# Patient Record
Sex: FEMALE | Race: BLACK OR AFRICAN AMERICAN | NOT HISPANIC OR LATINO | Employment: FULL TIME | URBAN - METROPOLITAN AREA
[De-identification: names, ages, dates, MRNs, and addresses within clinical notes are randomized per-mention and may not be internally consistent; named-entity substitution may affect disease eponyms.]

---

## 2020-09-30 ENCOUNTER — OFFICE VISIT (OUTPATIENT)
Dept: OBSTETRICS AND GYNECOLOGY | Facility: CLINIC | Age: 25
End: 2020-09-30
Payer: MEDICAID

## 2020-09-30 VITALS
HEART RATE: 108 BPM | SYSTOLIC BLOOD PRESSURE: 148 MMHG | BODY MASS INDEX: 30.16 KG/M2 | HEIGHT: 65 IN | WEIGHT: 181 LBS | DIASTOLIC BLOOD PRESSURE: 91 MMHG

## 2020-09-30 DIAGNOSIS — N30.01 ACUTE CYSTITIS WITH HEMATURIA: Primary | ICD-10-CM

## 2020-09-30 DIAGNOSIS — Z11.3 SCREEN FOR STD (SEXUALLY TRANSMITTED DISEASE): ICD-10-CM

## 2020-09-30 DIAGNOSIS — E66.09 CLASS 1 OBESITY DUE TO EXCESS CALORIES WITHOUT SERIOUS COMORBIDITY WITH BODY MASS INDEX (BMI) OF 30.0 TO 30.9 IN ADULT: ICD-10-CM

## 2020-09-30 PROBLEM — E66.9 OBESE: Status: ACTIVE | Noted: 2020-09-30

## 2020-09-30 PROCEDURE — 99203 PR OFFICE/OUTPT VISIT, NEW, LEVL III, 30-44 MIN: ICD-10-PCS | Mod: S$GLB,,, | Performed by: STUDENT IN AN ORGANIZED HEALTH CARE EDUCATION/TRAINING PROGRAM

## 2020-09-30 PROCEDURE — 99203 OFFICE O/P NEW LOW 30 MIN: CPT | Mod: S$GLB,,, | Performed by: STUDENT IN AN ORGANIZED HEALTH CARE EDUCATION/TRAINING PROGRAM

## 2020-09-30 RX ORDER — GRANULES FOR ORAL 3 G/1
3 POWDER ORAL ONCE
Qty: 3 G | Refills: 0 | Status: SHIPPED | OUTPATIENT
Start: 2020-09-30 | End: 2020-09-30

## 2020-09-30 NOTE — PROGRESS NOTES
"Chief Complaint: UTI    HPI: Patient is a 25 y.o. y.o. female G0 who presents to GYN clinic for evaluation of UTI.  Patient reports having UTI 3 times this year, however she has never been treated with antibiotics.  She reports she does increase her fluid intake when she is told she has UTI.  She reports good hygiene habits denies any fevers chills or back pain.  She would also like to be screened for STDs today denies any new partners in the past several years.  She has no other complaints at this time.    Review of Systems   Constitutional: Negative for chills and fever.   HENT: Negative for congestion and sore throat.    Respiratory: Negative for cough and shortness of breath.    Cardiovascular: Negative for chest pain and palpitations.   Gastrointestinal: Negative for abdominal pain, nausea and vomiting.   Genitourinary: Positive for dysuria and frequency.   Musculoskeletal: Negative for back pain.   Neurological: Negative for dizziness and headaches.   Psychiatric/Behavioral: Negative for depression and substance abuse.     PMH: denies  PSH: denies  Meds: denies  NKDA  Obhx: G0  GYNhx: denies abnormal pap smears, denies STD's, menstrual cycles - monthly, 6 days, mild VB  Social hx: denies t/d/e  Family hx: denies breast, colon, ovarian or uterine cancers.     Physical Exam:  Vitals:    09/30/20 1431   BP: (!) 148/91   Pulse: 108   Weight: 82.1 kg (181 lb)   Height: 5' 5" (1.651 m)   Body mass index is 30.12 kg/m².    Gen: NAD, well developed, well nourished, obese  Psych: alert and oriented x 3, normal affect  HEENT: normocephalic, atraumatic  Abd: soft, non-tender, non-distended  Pelvic: NEFG, no masses or lesions, vagina pink with rugae, no VB or VD, non friable cervix  BME: no CMT, uterus small, no tenderness or masses appreciated  Skin: warm and dry  Ext: no c/c/c, moves all extremities  Neurological: normal gait, gross motor function intact    Results:  UA - + nitrates and blood    Assessment: Patient is a " 25 y.o. y.o. female G0 with  Patient Active Problem List   Diagnosis    Acute cystitis with hematuria     Plan:  UA - + UTI today, will treat with fosfomycin 3 gm once   Urine culture sent  Pt to increase fluid intake, counseled on proper hygiene   GC/CZ, trich screening today  RTC in 1 year or sooner if needed     Ian Rodriguez MD

## 2020-12-09 ENCOUNTER — OFFICE VISIT (OUTPATIENT)
Dept: OBSTETRICS AND GYNECOLOGY | Facility: CLINIC | Age: 25
End: 2020-12-09
Payer: MEDICAID

## 2020-12-09 VITALS
DIASTOLIC BLOOD PRESSURE: 83 MMHG | HEART RATE: 87 BPM | SYSTOLIC BLOOD PRESSURE: 127 MMHG | BODY MASS INDEX: 30.12 KG/M2 | WEIGHT: 181 LBS

## 2020-12-09 DIAGNOSIS — Z31.89 ENCOUNTER FOR FERTILITY PLANNING: ICD-10-CM

## 2020-12-09 DIAGNOSIS — N93.9 ABNORMAL UTERINE BLEEDING (AUB): Primary | ICD-10-CM

## 2020-12-09 PROCEDURE — 99213 OFFICE O/P EST LOW 20 MIN: CPT | Mod: S$GLB,,, | Performed by: STUDENT IN AN ORGANIZED HEALTH CARE EDUCATION/TRAINING PROGRAM

## 2020-12-09 PROCEDURE — 99213 PR OFFICE/OUTPT VISIT, EST, LEVL III, 20-29 MIN: ICD-10-PCS | Mod: S$GLB,,, | Performed by: STUDENT IN AN ORGANIZED HEALTH CARE EDUCATION/TRAINING PROGRAM

## 2020-12-09 NOTE — PROGRESS NOTES
Chief Complaint: AUB, fertility    HPI: Patient is a 25 y.o. y.o. female G0 who presents to GYN clinic for AUB and fertility.  Patient reports that her UTI from her last visit has cleared up her medications.  She reports irregular cycles, reports missing occasional cycles.  Reports her last menstrual cycle was December 1st, she is currently off her cycle now.  She is in should in pregnancy reports trying earlier this year for a few months.  Not currently trying to become pregnant this time.  She is wanting to make sure she is still fertile and has the ability to become pregnant.  She has no other complaints this time.  Denies fever chills, nausea vomiting, chest pain, shortness of breath, vaginal bleeding, vaginal discharge, dysuria, lower extremity pain.    Physical Exam:  Vitals:    12/09/20 1548   BP: 127/83   Pulse: 87   Weight: 82.1 kg (181 lb)   Body mass index is 30.12 kg/m².    Gen: NAD, well developed, well nourished, obese  Psych: alert and oriented x 3, normal affect  HEENT: normocephalic, atraumatic  Non labored breathing  Skin: warm and dry  Ext: no c/c/c, moves all extremities  Neurological: normal gait, gross motor function intact    Results:  GC/CZ/trich - negative   Urine culture (9/30) - negative     Assessment: Patient is a 25 y.o. y.o. female G0 with  Patient Active Problem List   Diagnosis    Acute cystitis with hematuria    Obese     Plan:  Patient counseled on infertility, patient counseled that she would after the attempt pregnancy without contraception for 1 year without success to be considered for fertility workup  Patient given reassurance she was to be fertile at this age  Patient counseled on weight loss, and benefits of weight loss with improving menstrual cycles   Patient instructed to use ovulation kits and timed intercourse when she is ready for pregnancy  Patient does not desire contraception at this time  Return to clinic in 1 year for annual or as needed for problems    Ian  MD Michael

## 2022-04-11 ENCOUNTER — HISTORICAL (OUTPATIENT)
Dept: ADMINISTRATIVE | Facility: HOSPITAL | Age: 27
End: 2022-04-11
Payer: MEDICAID

## 2022-04-27 VITALS
HEIGHT: 65 IN | BODY MASS INDEX: 32.69 KG/M2 | SYSTOLIC BLOOD PRESSURE: 128 MMHG | WEIGHT: 196.19 LBS | DIASTOLIC BLOOD PRESSURE: 85 MMHG

## 2022-11-14 ENCOUNTER — LAB VISIT (OUTPATIENT)
Dept: LAB | Facility: HOSPITAL | Age: 27
End: 2022-11-14
Attending: OBSTETRICS & GYNECOLOGY
Payer: MEDICAID

## 2022-11-14 DIAGNOSIS — Z34.80 PRENATAL CARE, SUBSEQUENT PREGNANCY: Primary | ICD-10-CM

## 2022-11-14 LAB — B-HCG FREE SERPL-ACNC: 8821.15 MIU/ML

## 2022-11-14 PROCEDURE — 36415 COLL VENOUS BLD VENIPUNCTURE: CPT

## 2022-11-14 PROCEDURE — 84702 CHORIONIC GONADOTROPIN TEST: CPT

## 2023-11-20 ENCOUNTER — LAB VISIT (OUTPATIENT)
Dept: LAB | Facility: HOSPITAL | Age: 28
End: 2023-11-20
Attending: OBSTETRICS & GYNECOLOGY
Payer: MEDICAID

## 2023-11-20 DIAGNOSIS — Z34.80 PRENATAL CARE, SUBSEQUENT PREGNANCY: Primary | ICD-10-CM

## 2023-11-20 LAB
B-HCG FREE SERPL-ACNC: 65.97 MIU/ML
PROGEST SERPL-MCNC: 20.1 NG/ML

## 2023-11-20 PROCEDURE — 84144 ASSAY OF PROGESTERONE: CPT

## 2023-11-20 PROCEDURE — 36415 COLL VENOUS BLD VENIPUNCTURE: CPT

## 2023-11-20 PROCEDURE — 84702 CHORIONIC GONADOTROPIN TEST: CPT

## 2023-11-22 ENCOUNTER — LAB VISIT (OUTPATIENT)
Dept: LAB | Facility: HOSPITAL | Age: 28
End: 2023-11-22
Attending: OBSTETRICS & GYNECOLOGY
Payer: MEDICAID

## 2023-11-22 DIAGNOSIS — Z34.80 PRENATAL CARE, SUBSEQUENT PREGNANCY: Primary | ICD-10-CM

## 2023-11-22 LAB — B-HCG FREE SERPL-ACNC: 211.97 MIU/ML

## 2023-11-22 PROCEDURE — 36415 COLL VENOUS BLD VENIPUNCTURE: CPT

## 2023-11-22 PROCEDURE — 84702 CHORIONIC GONADOTROPIN TEST: CPT

## 2023-11-27 ENCOUNTER — LAB VISIT (OUTPATIENT)
Dept: LAB | Facility: HOSPITAL | Age: 28
End: 2023-11-27
Attending: OBSTETRICS & GYNECOLOGY
Payer: MEDICAID

## 2023-11-27 DIAGNOSIS — Z34.80 PRENATAL CARE, SUBSEQUENT PREGNANCY: Primary | ICD-10-CM

## 2023-11-27 LAB — B-HCG FREE SERPL-ACNC: 2001.72 MIU/ML

## 2023-11-27 PROCEDURE — 84702 CHORIONIC GONADOTROPIN TEST: CPT

## 2023-11-27 PROCEDURE — 36415 COLL VENOUS BLD VENIPUNCTURE: CPT

## 2023-11-29 ENCOUNTER — LAB VISIT (OUTPATIENT)
Dept: LAB | Facility: HOSPITAL | Age: 28
End: 2023-11-29
Attending: OBSTETRICS & GYNECOLOGY
Payer: MEDICAID

## 2023-11-29 DIAGNOSIS — Z34.80 PRENATAL CARE, SUBSEQUENT PREGNANCY: Primary | ICD-10-CM

## 2023-11-29 LAB — B-HCG FREE SERPL-ACNC: 4035.29 MIU/ML

## 2023-11-29 PROCEDURE — 36415 COLL VENOUS BLD VENIPUNCTURE: CPT

## 2023-11-29 PROCEDURE — 84702 CHORIONIC GONADOTROPIN TEST: CPT

## 2023-12-04 ENCOUNTER — LAB VISIT (OUTPATIENT)
Dept: LAB | Facility: HOSPITAL | Age: 28
End: 2023-12-04
Attending: OBSTETRICS & GYNECOLOGY
Payer: MEDICAID

## 2023-12-04 DIAGNOSIS — Z34.80 PRENATAL CARE, SUBSEQUENT PREGNANCY: Primary | ICD-10-CM

## 2023-12-04 LAB — B-HCG FREE SERPL-ACNC: ABNORMAL MIU/ML

## 2023-12-04 PROCEDURE — 36415 COLL VENOUS BLD VENIPUNCTURE: CPT

## 2023-12-04 PROCEDURE — 84702 CHORIONIC GONADOTROPIN TEST: CPT

## 2024-03-14 DIAGNOSIS — J35.1 TONSILLAR HYPERTROPHY: Primary | ICD-10-CM

## 2024-06-12 ENCOUNTER — OFFICE VISIT (OUTPATIENT)
Dept: OTOLARYNGOLOGY | Facility: CLINIC | Age: 29
End: 2024-06-12
Payer: MEDICAID

## 2024-06-12 DIAGNOSIS — J35.1 TONSILLAR HYPERTROPHY: Primary | ICD-10-CM

## 2024-06-12 DIAGNOSIS — J03.91 RECURRENT TONSILLITIS: ICD-10-CM

## 2024-06-12 DIAGNOSIS — G47.30 SLEEP-DISORDERED BREATHING: ICD-10-CM

## 2024-06-12 DIAGNOSIS — J35.8 ASYMMETRIC TONSILS: ICD-10-CM

## 2024-06-12 PROCEDURE — 99213 OFFICE O/P EST LOW 20 MIN: CPT | Mod: PBBFAC | Performed by: STUDENT IN AN ORGANIZED HEALTH CARE EDUCATION/TRAINING PROGRAM

## 2024-06-12 RX ORDER — SODIUM CHLORIDE 9 MG/ML
INJECTION, SOLUTION INTRAVENOUS CONTINUOUS
OUTPATIENT
Start: 2024-06-12

## 2024-06-12 RX ORDER — MUPIROCIN 20 MG/G
OINTMENT TOPICAL
OUTPATIENT
Start: 2024-06-12

## 2024-06-12 NOTE — PROGRESS NOTES
[facility]  Otolaryngology-Head & Neck Surgery    Office Visit    Marta Markham  [id number]  1995    CC: recurrent tonsillitis    HPI: Marta Markham is a 28 y.o. female with no significant past medical history presents to the ENT clinic for evaluation of recurrent tonsillitis.  Patient states multiple tonsil infections per year over the last 5 years.  She says that on average she gets infections per year over the last 5 years.  During these episodes she presents to the urgent care and is treated with antibiotics and steroids.  After a couple of weeks her symptoms will improve however her tonsils remain chronically enlarged affecting her sleeping.  She snores loudly during her sleep to the point that her boyfriend will no longer sleep in the same room with her.  No witnessed apneas however she will periodically wake up gasping for air at night.  She has no history of personal easy bleeding or bruising and is not on any blood thinners.  She has no family history of bleeding disorders.    Review of patient's allergies indicates:   Allergen Reactions    Ciprofloxacin hcl Rash       Past Medical History:   Diagnosis Date    Acne        History reviewed. No pertinent surgical history.    Social History     Socioeconomic History    Marital status: Single   Tobacco Use    Smoking status: Never    Smokeless tobacco: Never   Substance and Sexual Activity    Alcohol use: Never    Drug use: Never    Sexual activity: Yes     Partners: Male     Birth control/protection: Condom       No family history on file.    No outpatient encounter medications on file as of 6/12/2024.     No facility-administered encounter medications on file as of 6/12/2024.       PHYSICAL EXAM:  There were no vitals filed for this visit.    General Appearance: well nourished, well-developed, alert, oriented, in no acute distress, no dysphonia  Head/Face: Normocephalic, atraumatic  Eyes: EOMI, normal  conjunctiva  Ears: Hears well at normal conversation volume  AD: external normal, ear canal normal, TM intact without effusion or retraction  AS: external normal, ear canal normal, TM intact without effusion or retraction  Nose: External nose normal, septum midline, mild inferior turbinate hypertrophy, no epistaxis  Oral Cavity & Oropharynx: Lips normal. Tongue without masses or lesions. Dentition there. Oropharynx unremarkable. No masses, lesions, or leukoplakia. Floor of mouth and base of tongue are soft.  Tonsils are 4+ bilaterally, R>L  Neck: Soft, non-tender, no palpable lymph nodes. Thyroid without nodules or goiter.   Neuro: CN II - XII intact  Psychiatric: oriented to time, place and person, no depression, anxiety or agitation      ASSESSMENT:  Marta Markham is a 28 y.o. female with recurrent tonsillitis bilateral asymmetric tonsillar hypertrophy and significant sleep symptoms.    PLAN:  -- She does meet [place] criteria for tonsillectomy.  She will also benefit tonsillectomy because of her significant sleep symptoms.  Also given her tonsils are asymmetric tonsillectomy would be valuable for diagnostic purposes.  -- Discussed risks and benefits of surgery in great detail with patient.  Patient agrees to proceed with surgical management.  Consent obtained today in clinic.  We will proceed with tonsillectomy and possible adenoidectomy on 07/12/2024.  We monitor her after anesthesia and if she has any problems we will plan to observe her overnight in the hospital.  She was okay with this plan.    RTC 2-4 weeks postoperatively    Ben Joy MD  [facility]  11:18 AM 06/12/2024

## 2024-06-12 NOTE — H&P (VIEW-ONLY)
[facility]  Otolaryngology-Head & Neck Surgery    Office Visit    Marta Markham  [id number]  1995    CC: recurrent tonsillitis    HPI: Marta Markham is a 28 y.o. female with no significant past medical history presents to the ENT clinic for evaluation of recurrent tonsillitis.  Patient states multiple tonsil infections per year over the last 5 years.  She says that on average she gets infections per year over the last 5 years.  During these episodes she presents to the urgent care and is treated with antibiotics and steroids.  After a couple of weeks her symptoms will improve however her tonsils remain chronically enlarged affecting her sleeping.  She snores loudly during her sleep to the point that her boyfriend will no longer sleep in the same room with her.  No witnessed apneas however she will periodically wake up gasping for air at night.  She has no history of personal easy bleeding or bruising and is not on any blood thinners.  She has no family history of bleeding disorders.    Review of patient's allergies indicates:   Allergen Reactions    Ciprofloxacin hcl Rash       Past Medical History:   Diagnosis Date    Acne        History reviewed. No pertinent surgical history.    Social History     Socioeconomic History    Marital status: Single   Tobacco Use    Smoking status: Never    Smokeless tobacco: Never   Substance and Sexual Activity    Alcohol use: Never    Drug use: Never    Sexual activity: Yes     Partners: Male     Birth control/protection: Condom       No family history on file.    No outpatient encounter medications on file as of 6/12/2024.     No facility-administered encounter medications on file as of 6/12/2024.       PHYSICAL EXAM:  There were no vitals filed for this visit.    General Appearance: well nourished, well-developed, alert, oriented, in no acute distress, no dysphonia  Head/Face: Normocephalic, atraumatic  Eyes: EOMI, normal  conjunctiva  Ears: Hears well at normal conversation volume  AD: external normal, ear canal normal, TM intact without effusion or retraction  AS: external normal, ear canal normal, TM intact without effusion or retraction  Nose: External nose normal, septum midline, mild inferior turbinate hypertrophy, no epistaxis  Oral Cavity & Oropharynx: Lips normal. Tongue without masses or lesions. Dentition there. Oropharynx unremarkable. No masses, lesions, or leukoplakia. Floor of mouth and base of tongue are soft.  Tonsils are 4+ bilaterally, R>L  Neck: Soft, non-tender, no palpable lymph nodes. Thyroid without nodules or goiter.   Neuro: CN II - XII intact  Psychiatric: oriented to time, place and person, no depression, anxiety or agitation      ASSESSMENT:  Marta Markham is a 28 y.o. female with recurrent tonsillitis bilateral asymmetric tonsillar hypertrophy and significant sleep symptoms.    PLAN:  -- She does meet [place] criteria for tonsillectomy.  She will also benefit tonsillectomy because of her significant sleep symptoms.  Also given her tonsils are asymmetric tonsillectomy would be valuable for diagnostic purposes.  -- Discussed risks and benefits of surgery in great detail with patient.  Patient agrees to proceed with surgical management.  Consent obtained today in clinic.  We will proceed with tonsillectomy and possible adenoidectomy on 07/12/2024.  We monitor her after anesthesia and if she has any problems we will plan to observe her overnight in the hospital.  She was okay with this plan.    RTC 2-4 weeks postoperatively    Ben Joy MD  [facility]  11:18 AM 06/12/2024

## 2024-06-13 NOTE — PROGRESS NOTES
I have reviewed and agree with the resident's findings, including all diagnostic interpretations and plans as written.     Jose Luis Parks M.D.

## 2024-06-27 ENCOUNTER — ANESTHESIA EVENT (OUTPATIENT)
Dept: SURGERY | Facility: HOSPITAL | Age: 29
End: 2024-06-27
Payer: MEDICAID

## 2024-06-27 NOTE — ANESTHESIA PREPROCEDURE EVALUATION
Marta Markham is a 28 y.o. female PRESENTING FOR TONSILLECTOMY (Bilateral: Throat) with a history of   -RECURRENT TONSILLITIS    Vitals:    07/12/24 0509 07/12/24 0519 07/12/24 0525 07/12/24 0640   BP: (!) 141/90  (!) 141/90 132/81   BP Location: Left arm      Patient Position: Sitting   Lying   Pulse: 101   85   Resp:    20   Temp: 36.6 °C (97.9 °F)   37.1 °C (98.8 °F)   TempSrc: Oral   Temporal   SpO2: 98%   95%   Weight:  88.2 kg (194 lb 6.4 oz)     Height:           -ENLARGED TONSILS  -SNORING  -CHRONIC NECK PAIN  -CARPAL TUNNEL SYNDROME   -OBESITY      BETA-BLOCKER: NONE    New Orders for Anesthesia: UPT NEG DOS     Patient Active Problem List   Diagnosis    Acute cystitis with hematuria    Obese    Recurrent tonsillitis    Sleep-disordered breathing    Asymmetric tonsils     CMP  Sodium   Date Value Ref Range Status   12/02/2023 135 (L) 136 - 145 mmol/L Final     Potassium   Date Value Ref Range Status   12/02/2023 4.2 3.5 - 5.1 mmol/L Final     Chloride   Date Value Ref Range Status   12/02/2023 104 100 - 109 mmol/L Final     Carbon Dioxide   Date Value Ref Range Status   12/02/2023 22 22 - 33 mmol/L Final     Blood Urea Nitrogen   Date Value Ref Range Status   12/02/2023 9 5 - 25 mg/dL Final     Creatinine   Date Value Ref Range Status   12/02/2023 0.69 0.57 - 1.25 mg/dL Final     Calcium   Date Value Ref Range Status   12/02/2023 9.5 8.8 - 10.6 mg/dL Final     Anion Gap   Date Value Ref Range Status   12/02/2023 9 8 - 16 mmol/L Final       Past anesthesia records: NONE      Pre-op Assessment    I have reviewed the Patient Summary Reports.     I have reviewed the Nursing Notes. I have reviewed the NPO Status.   I have reviewed the Medications.     Review of Systems  Anesthesia Hx:  No problems with previous Anesthesia   History of prior surgery of interest to airway management or planning:          Denies Family Hx of Anesthesia complications.    Denies Personal Hx of Anesthesia complications.                     Hematology/Oncology:  Hematology Normal   Oncology Normal                                   EENT/Dental:  EENT/Dental Normal           Cardiovascular:  Cardiovascular Normal                                            Pulmonary:  Pulmonary Normal                       Renal/:  Renal/ Normal                 Hepatic/GI:  Hepatic/GI Normal                 Musculoskeletal:  Musculoskeletal Normal                Neurological:  Neurology Normal                                      Endocrine:  Endocrine Normal            Dermatological:  Skin Normal    Psych:  Psychiatric Normal                    Physical Exam  General: Well nourished, Cooperative, Alert and Oriented    Airway:  Mallampati: I / I  Mouth Opening: Normal  TM Distance: Normal  Tongue: Normal  Neck ROM: Normal ROM    Dental:  Intact        Anesthesia Plan  Type of Anesthesia, risks & benefits discussed:    Anesthesia Type: Gen ETT  Intra-op Monitoring Plan: Standard ASA Monitors  Post Op Pain Control Plan: multimodal analgesia and IV/PO Opioids PRN  Induction:  IV  Airway Plan: Direct  Informed Consent: Informed consent signed with the Patient and all parties understand the risks and agree with anesthesia plan.  All questions answered. Patient consented to blood products? No  ASA Score: 2  Day of Surgery Review of History & Physical: H&P Update referred to the surgeon/provider.    Ready For Surgery From Anesthesia Perspective.     .

## 2024-07-11 ENCOUNTER — PATIENT MESSAGE (OUTPATIENT)
Dept: SURGERY | Facility: HOSPITAL | Age: 29
End: 2024-07-11
Payer: MEDICAID

## 2024-07-12 ENCOUNTER — ANESTHESIA (OUTPATIENT)
Dept: SURGERY | Facility: HOSPITAL | Age: 29
End: 2024-07-12
Payer: MEDICAID

## 2024-07-12 ENCOUNTER — HOSPITAL ENCOUNTER (OUTPATIENT)
Facility: HOSPITAL | Age: 29
Discharge: HOME | End: 2024-07-12
Attending: OTOLARYNGOLOGY | Admitting: OTOLARYNGOLOGY
Payer: MEDICAID

## 2024-07-12 VITALS
HEIGHT: 66 IN | BODY MASS INDEX: 31.24 KG/M2 | RESPIRATION RATE: 18 BRPM | DIASTOLIC BLOOD PRESSURE: 69 MMHG | OXYGEN SATURATION: 97 % | HEART RATE: 67 BPM | TEMPERATURE: 97 F | SYSTOLIC BLOOD PRESSURE: 126 MMHG | WEIGHT: 194.38 LBS

## 2024-07-12 DIAGNOSIS — J35.1 TONSILLAR HYPERTROPHY: ICD-10-CM

## 2024-07-12 DIAGNOSIS — J03.91 RECURRENT TONSILLITIS: Primary | ICD-10-CM

## 2024-07-12 LAB
ANION GAP SERPL CALC-SCNC: 8 MEQ/L
B-HCG UR QL: NEGATIVE
BASOPHILS # BLD AUTO: 0.05 X10(3)/MCL
BASOPHILS NFR BLD AUTO: 0.5 %
BUN SERPL-MCNC: 12.4 MG/DL (ref 7–18.7)
CALCIUM SERPL-MCNC: 9.4 MG/DL (ref 8.4–10.2)
CHLORIDE SERPL-SCNC: 112 MMOL/L (ref 98–107)
CO2 SERPL-SCNC: 22 MMOL/L (ref 22–29)
CREAT SERPL-MCNC: 0.74 MG/DL (ref 0.55–1.02)
CREAT/UREA NIT SERPL: 17
CTP QC/QA: YES
EOSINOPHIL # BLD AUTO: 0.24 X10(3)/MCL (ref 0–0.9)
EOSINOPHIL NFR BLD AUTO: 2.2 %
ERYTHROCYTE [DISTWIDTH] IN BLOOD BY AUTOMATED COUNT: 12 % (ref 11.5–17)
GFR SERPLBLD CREATININE-BSD FMLA CKD-EPI: >60 ML/MIN/1.73/M2
GLUCOSE SERPL-MCNC: 102 MG/DL (ref 74–100)
HCT VFR BLD AUTO: 39.9 % (ref 37–47)
HGB BLD-MCNC: 13.5 G/DL (ref 12–16)
IMM GRANULOCYTES # BLD AUTO: 0.03 X10(3)/MCL (ref 0–0.04)
IMM GRANULOCYTES NFR BLD AUTO: 0.3 %
LYMPHOCYTES # BLD AUTO: 2.96 X10(3)/MCL (ref 0.6–4.6)
LYMPHOCYTES NFR BLD AUTO: 27.2 %
MCH RBC QN AUTO: 29.7 PG (ref 27–31)
MCHC RBC AUTO-ENTMCNC: 33.8 G/DL (ref 33–36)
MCV RBC AUTO: 87.7 FL (ref 80–94)
MONOCYTES # BLD AUTO: 0.6 X10(3)/MCL (ref 0.1–1.3)
MONOCYTES NFR BLD AUTO: 5.5 %
NEUTROPHILS # BLD AUTO: 6.99 X10(3)/MCL (ref 2.1–9.2)
NEUTROPHILS NFR BLD AUTO: 64.3 %
NRBC BLD AUTO-RTO: 0 %
PLATELET # BLD AUTO: 338 X10(3)/MCL (ref 130–400)
PMV BLD AUTO: 9.6 FL (ref 7.4–10.4)
POTASSIUM SERPL-SCNC: 3.9 MMOL/L (ref 3.5–5.1)
RBC # BLD AUTO: 4.55 X10(6)/MCL (ref 4.2–5.4)
SODIUM SERPL-SCNC: 142 MMOL/L (ref 136–145)
WBC # BLD AUTO: 10.87 X10(3)/MCL (ref 4.5–11.5)

## 2024-07-12 PROCEDURE — 36000707: Performed by: OTOLARYNGOLOGY

## 2024-07-12 PROCEDURE — 81025 URINE PREGNANCY TEST: CPT | Performed by: NURSE PRACTITIONER

## 2024-07-12 PROCEDURE — 63600175 PHARM REV CODE 636 W HCPCS: Performed by: NURSE ANESTHETIST, CERTIFIED REGISTERED

## 2024-07-12 PROCEDURE — 71000033 HC RECOVERY, INTIAL HOUR: Performed by: OTOLARYNGOLOGY

## 2024-07-12 PROCEDURE — 85025 COMPLETE CBC W/AUTO DIFF WBC: CPT | Performed by: STUDENT IN AN ORGANIZED HEALTH CARE EDUCATION/TRAINING PROGRAM

## 2024-07-12 PROCEDURE — 80048 BASIC METABOLIC PNL TOTAL CA: CPT | Performed by: STUDENT IN AN ORGANIZED HEALTH CARE EDUCATION/TRAINING PROGRAM

## 2024-07-12 PROCEDURE — 71000016 HC POSTOP RECOV ADDL HR: Performed by: OTOLARYNGOLOGY

## 2024-07-12 PROCEDURE — 25000003 PHARM REV CODE 250: Performed by: NURSE ANESTHETIST, CERTIFIED REGISTERED

## 2024-07-12 PROCEDURE — 37000009 HC ANESTHESIA EA ADD 15 MINS: Performed by: OTOLARYNGOLOGY

## 2024-07-12 PROCEDURE — 63600175 PHARM REV CODE 636 W HCPCS: Performed by: SPECIALIST

## 2024-07-12 PROCEDURE — 88304 TISSUE EXAM BY PATHOLOGIST: CPT | Mod: TC | Performed by: OTOLARYNGOLOGY

## 2024-07-12 PROCEDURE — 37000008 HC ANESTHESIA 1ST 15 MINUTES: Performed by: OTOLARYNGOLOGY

## 2024-07-12 PROCEDURE — 71000015 HC POSTOP RECOV 1ST HR: Performed by: OTOLARYNGOLOGY

## 2024-07-12 PROCEDURE — 25000003 PHARM REV CODE 250: Performed by: SPECIALIST

## 2024-07-12 PROCEDURE — 36000706: Performed by: OTOLARYNGOLOGY

## 2024-07-12 RX ORDER — PROPOFOL 10 MG/ML
VIAL (ML) INTRAVENOUS
Status: DISCONTINUED | OUTPATIENT
Start: 2024-07-12 | End: 2024-07-12

## 2024-07-12 RX ORDER — HYDROMORPHONE HYDROCHLORIDE 1 MG/ML
0.5 INJECTION, SOLUTION INTRAMUSCULAR; INTRAVENOUS; SUBCUTANEOUS EVERY 5 MIN PRN
Status: DISCONTINUED | OUTPATIENT
Start: 2024-07-12 | End: 2024-07-12 | Stop reason: HOSPADM

## 2024-07-12 RX ORDER — OXYCODONE HYDROCHLORIDE 5 MG/1
5 TABLET ORAL EVERY 6 HOURS PRN
Qty: 24 TABLET | Refills: 0 | Status: SHIPPED | OUTPATIENT
Start: 2024-07-12 | End: 2024-07-16

## 2024-07-12 RX ORDER — OXYCODONE AND ACETAMINOPHEN 5; 325 MG/1; MG/1
2 TABLET ORAL ONCE
Status: DISCONTINUED | OUTPATIENT
Start: 2024-07-12 | End: 2024-07-12

## 2024-07-12 RX ORDER — IBUPROFEN 200 MG
800 TABLET ORAL EVERY 6 HOURS
Qty: 30 TABLET | Refills: 0 | Status: SHIPPED | OUTPATIENT
Start: 2024-07-12

## 2024-07-12 RX ORDER — ACETAMINOPHEN 500 MG
500 TABLET ORAL EVERY 6 HOURS
Qty: 30 TABLET | Refills: 0 | Status: SHIPPED | OUTPATIENT
Start: 2024-07-12

## 2024-07-12 RX ORDER — HYDROMORPHONE HYDROCHLORIDE 1 MG/ML
INJECTION, SOLUTION INTRAMUSCULAR; INTRAVENOUS; SUBCUTANEOUS
Status: DISCONTINUED | OUTPATIENT
Start: 2024-07-12 | End: 2024-07-12

## 2024-07-12 RX ORDER — PROCHLORPERAZINE EDISYLATE 5 MG/ML
5 INJECTION INTRAMUSCULAR; INTRAVENOUS ONCE AS NEEDED
Status: COMPLETED | OUTPATIENT
Start: 2024-07-12 | End: 2024-07-12

## 2024-07-12 RX ORDER — GLUCAGON 1 MG
1 KIT INJECTION
Status: DISCONTINUED | OUTPATIENT
Start: 2024-07-12 | End: 2024-07-12 | Stop reason: HOSPADM

## 2024-07-12 RX ORDER — ONDANSETRON 8 MG/1
8 TABLET, ORALLY DISINTEGRATING ORAL 2 TIMES DAILY
Qty: 20 TABLET | Refills: 0 | Status: SHIPPED | OUTPATIENT
Start: 2024-07-12

## 2024-07-12 RX ORDER — HYDROMORPHONE HYDROCHLORIDE 1 MG/ML
0.2 INJECTION, SOLUTION INTRAMUSCULAR; INTRAVENOUS; SUBCUTANEOUS EVERY 5 MIN PRN
Status: DISCONTINUED | OUTPATIENT
Start: 2024-07-12 | End: 2024-07-12 | Stop reason: HOSPADM

## 2024-07-12 RX ORDER — ONDANSETRON HYDROCHLORIDE 2 MG/ML
INJECTION, SOLUTION INTRAVENOUS
Status: DISCONTINUED | OUTPATIENT
Start: 2024-07-12 | End: 2024-07-12

## 2024-07-12 RX ORDER — OXYCODONE HCL 5 MG/5 ML
5 SOLUTION, ORAL ORAL EVERY 6 HOURS PRN
Qty: 100 ML | Refills: 0 | Status: SHIPPED | OUTPATIENT
Start: 2024-07-12 | End: 2024-07-12 | Stop reason: HOSPADM

## 2024-07-12 RX ORDER — KETAMINE HYDROCHLORIDE 10 MG/ML
INJECTION, SOLUTION INTRAMUSCULAR; INTRAVENOUS
Status: DISCONTINUED | OUTPATIENT
Start: 2024-07-12 | End: 2024-07-12

## 2024-07-12 RX ORDER — ROCURONIUM BROMIDE 10 MG/ML
INJECTION, SOLUTION INTRAVENOUS
Status: DISCONTINUED | OUTPATIENT
Start: 2024-07-12 | End: 2024-07-12

## 2024-07-12 RX ORDER — DEXAMETHASONE SODIUM PHOSPHATE 4 MG/ML
INJECTION, SOLUTION INTRA-ARTICULAR; INTRALESIONAL; INTRAMUSCULAR; INTRAVENOUS; SOFT TISSUE
Status: DISCONTINUED | OUTPATIENT
Start: 2024-07-12 | End: 2024-07-12

## 2024-07-12 RX ORDER — SODIUM CHLORIDE 9 MG/ML
INJECTION, SOLUTION INTRAVENOUS CONTINUOUS
Status: DISCONTINUED | OUTPATIENT
Start: 2024-07-12 | End: 2024-07-12 | Stop reason: HOSPADM

## 2024-07-12 RX ORDER — DEXAMETHASONE 6 MG/1
6 TABLET ORAL
Qty: 5 TABLET | Refills: 0 | Status: SHIPPED | OUTPATIENT
Start: 2024-07-12

## 2024-07-12 RX ORDER — MEPERIDINE HYDROCHLORIDE 25 MG/ML
12.5 INJECTION INTRAMUSCULAR; INTRAVENOUS; SUBCUTANEOUS ONCE
Status: DISCONTINUED | OUTPATIENT
Start: 2024-07-12 | End: 2024-07-12 | Stop reason: HOSPADM

## 2024-07-12 RX ORDER — LABETALOL HYDROCHLORIDE 5 MG/ML
INJECTION, SOLUTION INTRAVENOUS
Status: DISCONTINUED | OUTPATIENT
Start: 2024-07-12 | End: 2024-07-12

## 2024-07-12 RX ORDER — ONDANSETRON HYDROCHLORIDE 2 MG/ML
4 INJECTION, SOLUTION INTRAVENOUS ONCE
Status: DISCONTINUED | OUTPATIENT
Start: 2024-07-12 | End: 2024-07-12 | Stop reason: HOSPADM

## 2024-07-12 RX ORDER — DEXAMETHASONE 6 MG/1
6 TABLET ORAL ONCE
Qty: 1 TABLET | Refills: 0 | Status: SHIPPED | OUTPATIENT
Start: 2024-07-12 | End: 2024-07-12

## 2024-07-12 RX ORDER — IPRATROPIUM BROMIDE AND ALBUTEROL SULFATE 2.5; .5 MG/3ML; MG/3ML
3 SOLUTION RESPIRATORY (INHALATION) ONCE AS NEEDED
Status: DISCONTINUED | OUTPATIENT
Start: 2024-07-12 | End: 2024-07-12 | Stop reason: HOSPADM

## 2024-07-12 RX ORDER — OXYCODONE HYDROCHLORIDE 5 MG/1
10 TABLET ORAL ONCE
Status: COMPLETED | OUTPATIENT
Start: 2024-07-12 | End: 2024-07-12

## 2024-07-12 RX ORDER — MUPIROCIN 20 MG/G
OINTMENT TOPICAL
Status: DISCONTINUED | OUTPATIENT
Start: 2024-07-12 | End: 2024-07-12 | Stop reason: HOSPADM

## 2024-07-12 RX ORDER — SODIUM CHLORIDE, SODIUM LACTATE, POTASSIUM CHLORIDE, CALCIUM CHLORIDE 600; 310; 30; 20 MG/100ML; MG/100ML; MG/100ML; MG/100ML
INJECTION, SOLUTION INTRAVENOUS CONTINUOUS
Status: DISCONTINUED | OUTPATIENT
Start: 2024-07-12 | End: 2024-07-12 | Stop reason: HOSPADM

## 2024-07-12 RX ORDER — SUCCINYLCHOLINE CHLORIDE 20 MG/ML
INJECTION INTRAMUSCULAR; INTRAVENOUS
Status: DISCONTINUED | OUTPATIENT
Start: 2024-07-12 | End: 2024-07-12

## 2024-07-12 RX ORDER — MIDAZOLAM HYDROCHLORIDE 2 MG/2ML
2 INJECTION, SOLUTION INTRAMUSCULAR; INTRAVENOUS ONCE AS NEEDED
Status: COMPLETED | OUTPATIENT
Start: 2024-07-12 | End: 2024-07-12

## 2024-07-12 RX ORDER — OXYCODONE HCL 5 MG/5 ML
5 SOLUTION, ORAL ORAL EVERY 6 HOURS PRN
Qty: 100 ML | Refills: 0 | Status: SHIPPED | OUTPATIENT
Start: 2024-07-12 | End: 2024-07-12

## 2024-07-12 RX ORDER — DIPHENHYDRAMINE HYDROCHLORIDE 50 MG/ML
25 INJECTION INTRAMUSCULAR; INTRAVENOUS ONCE AS NEEDED
Status: DISCONTINUED | OUTPATIENT
Start: 2024-07-12 | End: 2024-07-12 | Stop reason: HOSPADM

## 2024-07-12 RX ORDER — FENTANYL CITRATE 50 UG/ML
INJECTION, SOLUTION INTRAMUSCULAR; INTRAVENOUS
Status: DISCONTINUED | OUTPATIENT
Start: 2024-07-12 | End: 2024-07-12

## 2024-07-12 RX ORDER — LIDOCAINE HYDROCHLORIDE 20 MG/ML
INJECTION INTRAVENOUS
Status: DISCONTINUED | OUTPATIENT
Start: 2024-07-12 | End: 2024-07-12

## 2024-07-12 RX ORDER — DEXMEDETOMIDINE HYDROCHLORIDE 100 UG/ML
INJECTION, SOLUTION INTRAVENOUS
Status: DISCONTINUED | OUTPATIENT
Start: 2024-07-12 | End: 2024-07-12

## 2024-07-12 RX ORDER — ACETAMINOPHEN 10 MG/ML
1000 INJECTION, SOLUTION INTRAVENOUS ONCE
Status: COMPLETED | OUTPATIENT
Start: 2024-07-12 | End: 2024-07-12

## 2024-07-12 RX ADMIN — PROCHLORPERAZINE EDISYLATE 5 MG: 5 INJECTION INTRAMUSCULAR; INTRAVENOUS at 11:07

## 2024-07-12 RX ADMIN — ACETAMINOPHEN 1000 MG: 10 INJECTION, SOLUTION INTRAVENOUS at 09:07

## 2024-07-12 RX ADMIN — DEXAMETHASONE SODIUM PHOSPHATE 12 MG: 4 INJECTION, SOLUTION INTRA-ARTICULAR; INTRALESIONAL; INTRAMUSCULAR; INTRAVENOUS; SOFT TISSUE at 07:07

## 2024-07-12 RX ADMIN — PROPOFOL 200 MG: 10 INJECTION, EMULSION INTRAVENOUS at 07:07

## 2024-07-12 RX ADMIN — FENTANYL CITRATE 50 MCG: 50 INJECTION INTRAMUSCULAR; INTRAVENOUS at 07:07

## 2024-07-12 RX ADMIN — SODIUM CHLORIDE, POTASSIUM CHLORIDE, SODIUM LACTATE AND CALCIUM CHLORIDE: 600; 310; 30; 20 INJECTION, SOLUTION INTRAVENOUS at 06:07

## 2024-07-12 RX ADMIN — DEXMEDETOMIDINE 10 MCG: 200 INJECTION, SOLUTION INTRAVENOUS at 08:07

## 2024-07-12 RX ADMIN — SUGAMMADEX 200 MG: 100 INJECTION, SOLUTION INTRAVENOUS at 08:07

## 2024-07-12 RX ADMIN — HYDROMORPHONE HYDROCHLORIDE 0.25 MG: 1 INJECTION, SOLUTION INTRAMUSCULAR; INTRAVENOUS; SUBCUTANEOUS at 08:07

## 2024-07-12 RX ADMIN — KETAMINE HYDROCHLORIDE 25 MG: 10 INJECTION, SOLUTION INTRAMUSCULAR; INTRAVENOUS at 07:07

## 2024-07-12 RX ADMIN — DEXMEDETOMIDINE 20 MCG: 200 INJECTION, SOLUTION INTRAVENOUS at 09:07

## 2024-07-12 RX ADMIN — OXYCODONE HYDROCHLORIDE 10 MG: 5 TABLET ORAL at 10:07

## 2024-07-12 RX ADMIN — LABETALOL HYDROCHLORIDE 2.5 MG: 5 INJECTION INTRAVENOUS at 07:07

## 2024-07-12 RX ADMIN — ROCURONIUM BROMIDE 50 MG: 10 INJECTION INTRAVENOUS at 07:07

## 2024-07-12 RX ADMIN — KETAMINE HYDROCHLORIDE 15 MG: 10 INJECTION, SOLUTION INTRAMUSCULAR; INTRAVENOUS at 09:07

## 2024-07-12 RX ADMIN — SUCCINYLCHOLINE CHLORIDE 40 MG: 20 INJECTION, SOLUTION INTRAMUSCULAR; INTRAVENOUS; PARENTERAL at 09:07

## 2024-07-12 RX ADMIN — KETAMINE HYDROCHLORIDE 10 MG: 10 INJECTION, SOLUTION INTRAMUSCULAR; INTRAVENOUS at 08:07

## 2024-07-12 RX ADMIN — LIDOCAINE HYDROCHLORIDE 100 MG: 20 INJECTION INTRAVENOUS at 07:07

## 2024-07-12 RX ADMIN — MIDAZOLAM HYDROCHLORIDE 2 MG: 1 INJECTION, SOLUTION INTRAMUSCULAR; INTRAVENOUS at 06:07

## 2024-07-12 RX ADMIN — FENTANYL CITRATE 50 MCG: 50 INJECTION INTRAMUSCULAR; INTRAVENOUS at 08:07

## 2024-07-12 RX ADMIN — ONDANSETRON 4 MG: 2 INJECTION INTRAMUSCULAR; INTRAVENOUS at 08:07

## 2024-07-12 RX ADMIN — LIDOCAINE HYDROCHLORIDE 100 MG: 20 INJECTION INTRAVENOUS at 08:07

## 2024-07-12 NOTE — TRANSFER OF CARE
"Anesthesia Transfer of Care Note    Patient: Marta Markham    Procedure(s) Performed: Procedure(s) (LRB):  TONSILLECTOMY (Bilateral)    Patient location: PACU    Anesthesia Type: general    Transport from OR: Transported from OR on room air with adequate spontaneous ventilation    Post pain: adequate analgesia    Post assessment: no apparent anesthetic complications and tolerated procedure well    Post vital signs: stable    Level of consciousness: sedated    Nausea/Vomiting: no nausea/vomiting    Complications: none    Transfer of care protocol was followedComments: Report to [facility]       Last vitals: Visit Vitals  /71 (BP Location: Right arm, Patient Position: Lying)   Pulse 82   Temp 35.7 °C (96.2 °F)   Resp 15   Ht 5' 6" (1.676 m)   Wt 88.2 kg (194 lb 6.4 oz)   LMP 06/19/2024 (Exact Date)   SpO2 100%   Breastfeeding No   BMI 31.38 kg/m²     "

## 2024-07-12 NOTE — ANESTHESIA POSTPROCEDURE EVALUATION
Anesthesia Post Evaluation    Patient: Marta Markham    Procedure(s) Performed: Procedure(s) (LRB):  TONSILLECTOMY (Bilateral)    Final Anesthesia Type: general      Patient location during evaluation: PACU  Patient participation: Yes- Able to Participate  Level of consciousness: awake and responds to stimulation  Post-procedure vital signs: reviewed and stable  Pain management: adequate  Airway patency: patent    PONV status at discharge: No PONV  Anesthetic complications: no      Cardiovascular status: blood pressure returned to baseline  Respiratory status: unassisted  Hydration status: euvolemic  Follow-up not needed.              Vitals Value Taken Time   /84 07/12/24 1047   Temp 36.4 °C (97.6 °F) 07/12/24 0948   Pulse 79 07/12/24 1100   Resp 18 07/12/24 1045   SpO2 100 % 07/12/24 1100   Vitals shown include unfiled device data.      No case tracking events are documented in the log.      Pain/Evans Score: Pain Rating Prior to Med Admin: 10 (7/12/2024 10:29 AM)  Evans Score: 10 (7/12/2024  9:48 AM)

## 2024-07-12 NOTE — DISCHARGE INSTRUCTIONS
Keep follow up appointment at the [facility].      No heavy lifting over 10 pounds or straining for 2 WEEKS.     You may take a shower tomorrow.      KEEP WELL HYDRATED.  Drink plenty of cold liquids, avoid hot liquids for the next several days.  Do not drink with a straw or from a sports bottle for 2 weeks.     Start off with liquids only, then progress diet to soft foods only for several days.  Salt water gargles are ok.    For pain, alternate Ibuprofen with Tylenol. For more severe pain, add the oxycodone to the regimen. You will be starting a steroid called dexamethasone on Monday.   Expect a sore throat for 2 weeks.    If you are experiencing severe pain even with your pain medications, please call the ENT clinic at [phone] to notify your doctor.     CALL MD WITH ANY SIGNS OF BLEEDING.    You may use an ice pack on the sides of your neck as needed for 20 minutes at a time to minimize swelling and help relieve pain.     Resume other medications tomorrow.    Do not drink alcohol or drive today, or as long as you are on pain medication.       Notify MD of any moderate to severe pain unrelieved by pain medicine, if you have continuous or severe bleeding, or for any signs of infection including fever above 100.4, excessive redness or swelling, yellow/green foul- smelling drainage, nausea or vomiting.  Clinics number is [phone]. If it is after business hours or emergency call [phone] and state Im having post op complications and need to speak to the ENT surgeon on call.

## 2024-07-12 NOTE — ANESTHESIA PROCEDURE NOTES
Intubation    Date/Time: 7/12/2024 7:13 AM    Performed by: Carlos Alberto Ruiz CRNA  Authorized by: Sarai Fernández MD    Intubation:     Induction:  Intravenous    Intubated:  Postinduction    Mask Ventilation:  Easy with oral airway    Attempts:  1    Attempted By:  CRNA    Method of Intubation:  Direct    Blade:  Simi 4    Laryngeal View Grade: Grade IIA - cords partially seen      Difficult Airway Encountered?: No      Complications:  None    Airway Device:  Oral endotracheal tube    Airway Device Size:  7.5    Style/Cuff Inflation:  Cuffed (inflated to minimal occlusive pressure)    Inflation Amount (mL):  6    Tube secured:  21    Secured at:  The lips    Placement Verified By:  Capnometry    Complicating Factors:  None    Findings Post-Intubation:  BS equal bilateral and atraumatic/condition of teeth unchanged

## 2024-07-12 NOTE — OP NOTE
Otolaryngology Operative Note    Date of procedure: 07/12/2024    Attending Surgeon: MD Charly    Resident Surgeon: Boom Olson MD PGY3  Emmy Yen PGY4    Pre-operative diagnosis:  1. Recurrent tonsillitis      Post-operative diagnosis:   1. same    Procedure:  1. Tonsillectomy    Anesthesia: general    Complications: none    Specimens: right and left tonsil    Blood loss: 50cc    Findings: 3+ tonsil right, 4+ left tonsil  Denuded uvular edges requiring 3-0 vicryl    Indication:  Marta Markham is a 28 y.o. female with recurrent tonsillitis bilateral asymmetric tonsillar hypertrophy and significant sleep symptoms. Here for tonsillectomy    Procedure in detail:  Timeout was performed verifying patient's name, procedure, laterality and allergies. 10mg IV decadron was administered preoperatively. Patient was draped in usaul fashion. Fernanda-dominic mouth gag was inserted and oral cavity was exposed. Patient was placed in suspension. Palate was examined for submucous cleft and bifid uvula and was noted to not have either.  3+ tonsils were noted bilaterally. Red rubber catheter was inserted in through the nare and brought out through the mouth and clamped with a tonsillar clamp.Allis clamp was used to grasp the superior pole of the right tonsil. Monopolar cautery was used to dissect the tonsil from the surrounding stroma and musculature. Right tonsil was passed off to the back table for pathology. Attention was then turned to the left tonsil. Allis clamp was used to grasp the superior pole. Monopolar cautery was used to dissect the tonsil from the surrounding stroma and musculature. Left tonsil was passed off to the back table for pathology. Left tonsil extremely endophytic going into the superior pole, anterior pillar superiorly was resected, and a part of the uvula was also resected.The incision traversed supreiorly onto a portion of soft palate. The denuded edge of the uvula on the left was  reapproximated with a 3-0 vicryl.  Red rubber catheter was removed. Tonsillar fossas were irrigated out and challenged mechanically using metal baby Yankauer suction. Hemostasis was achieved in bilateral fossas using suction bovie cautery. Suspension was then released momentarily and resuspended for one final hemostasis check. Fernanda-dominic mouth gag was then released from suspension and removed in atraumatic fashion.    Patient was then undraped, cleaned off, and turned over to anesthesia for emergence. Patient tolerated procedure well, was extubated without issue and transferred to PACU in stable condition.    The patient tolerated the procedure well and without complications.  The patient's care was delivered into the hands of the anesthesia team thereafter.  All counts were correct at the end of the procedure.    Dr. Parks was present and scrubbed for the entire procedure.    7/12/2024  7:19 AM    MATTY Yen MD  [facility] Department of Otolaryngology  [facility]

## 2024-07-16 RX ORDER — HYDROCODONE BITARTRATE AND ACETAMINOPHEN 10; 325 MG/1; MG/1
1 TABLET ORAL EVERY 6 HOURS PRN
Qty: 30 TABLET | Refills: 0 | Status: SHIPPED | OUTPATIENT
Start: 2024-07-16

## 2024-07-16 RX ORDER — OXYCODONE HYDROCHLORIDE 10 MG/1
10 TABLET ORAL EVERY 4 HOURS PRN
Qty: 30 TABLET | Refills: 0 | Status: SHIPPED | OUTPATIENT
Start: 2024-07-16

## 2024-07-17 LAB
ESTROGEN SERPL-MCNC: NORMAL PG/ML
INSULIN SERPL-ACNC: NORMAL U[IU]/ML
LAB AP CLINICAL INFORMATION: NORMAL
LAB AP GROSS DESCRIPTION: NORMAL
LAB AP REPORT FOOTNOTES: NORMAL
T3RU NFR SERPL: NORMAL %

## 2024-07-25 ENCOUNTER — OFFICE VISIT (OUTPATIENT)
Dept: OTOLARYNGOLOGY | Facility: CLINIC | Age: 29
End: 2024-07-25
Payer: MEDICAID

## 2024-07-25 VITALS
HEIGHT: 66 IN | WEIGHT: 194.44 LBS | SYSTOLIC BLOOD PRESSURE: 122 MMHG | DIASTOLIC BLOOD PRESSURE: 77 MMHG | TEMPERATURE: 97 F | BODY MASS INDEX: 31.25 KG/M2 | HEART RATE: 88 BPM | OXYGEN SATURATION: 99 % | RESPIRATION RATE: 18 BRPM

## 2024-07-25 DIAGNOSIS — G47.30 SLEEP-DISORDERED BREATHING: ICD-10-CM

## 2024-07-25 DIAGNOSIS — J35.1 TONSILLAR HYPERTROPHY: Primary | ICD-10-CM

## 2024-07-25 DIAGNOSIS — J03.91 RECURRENT TONSILLITIS: ICD-10-CM

## 2024-07-25 DIAGNOSIS — J35.8 ASYMMETRIC TONSILS: ICD-10-CM

## 2024-07-25 PROCEDURE — 99213 OFFICE O/P EST LOW 20 MIN: CPT | Mod: PBBFAC

## 2024-07-25 NOTE — PROGRESS NOTES
[facility]  Otolaryngology-Head & Neck Surgery    Office Visit    Marta Markham  [id number]  1995    CC: recurrent tonsillitis    HPI: Marta Markham is a 28 y.o. female with no significant past medical history presents to the ENT clinic for evaluation of recurrent tonsillitis.  Patient states multiple tonsil infections per year over the last 5 years.  She says that on average she gets infections per year over the last 5 years.  During these episodes she presents to the urgent care and is treated with antibiotics and steroids.  After a couple of weeks her symptoms will improve however her tonsils remain chronically enlarged affecting her sleeping.  She snores loudly during her sleep to the point that her boyfriend will no longer sleep in the same room with her.  No witnessed apneas however she will periodically wake up gasping for air at night.  She has no history of personal easy bleeding or bruising and is not on any blood thinners.  She has no family history of bleeding disorders.    7/25/24:  Patient presents for initial postop visit.  She reports that she had significant pain and dysphagia for the 1st 2 weeks following surgery.  Initially she had difficulty getting foods and even fluids down had some nasal regurgitation.  This has since improved and she is tolerating liquids and medications without problems.  She is still having some odynophagia with solids has been avoiding them for the most part but overall feels like she is doing much better.  Denies any nasal regurgitation at this time    Review of patient's allergies indicates:   Allergen Reactions    Ciprofloxacin hcl Rash       Past Medical History:   Diagnosis Date    Acne     Sleep-disordered breathing 06/12/2024       Past Surgical History:   Procedure Laterality Date    TONSILLECTOMY Bilateral 7/12/2024    Procedure: TONSILLECTOMY;  Surgeon: Jose Luis Parks MD;  Location: [facility];  Service: ENT;   Laterality: Bilateral;       Social History     Socioeconomic History    Marital status: Single   Tobacco Use    Smoking status: Never    Smokeless tobacco: Never   Substance and Sexual Activity    Alcohol use: Never    Drug use: Never    Sexual activity: Yes     Partners: Male     Birth control/protection: Condom       No family history on file.    Outpatient Encounter Medications as of 2024   Medication Sig Dispense Refill    acetaminophen (TYLENOL) 500 MG tablet Take 1 tablet (500 mg total) by mouth every 6 (six) hours. 30 tablet 0    dexAMETHasone (DECADRON) 6 MG tablet Take 1 tablet (6 mg total) by mouth daily with breakfast. 5 tablet 0    HYDROcodone-acetaminophen (NORCO)  mg per tablet Take 1 tablet by mouth every 6 (six) hours as needed for Pain. 30 tablet 0    ibuprofen (ADVIL,MOTRIN) 200 MG tablet Take 4 tablets (800 mg total) by mouth every 6 (six) hours. 30 tablet 0    ondansetron (ZOFRAN-ODT) 8 MG TbDL Take 1 tablet (8 mg total) by mouth 2 (two) times daily. 20 tablet 0    oxyCODONE (ROXICODONE) 10 mg Tab immediate release tablet Take 1 tablet (10 mg total) by mouth every 4 (four) hours as needed for Pain. 30 tablet 0    [DISCONTINUED] dexAMETHasone (DECADRON) 6 MG tablet Take 1 tablet (6 mg total) by mouth once. for 1 dose 1 tablet 0    [DISCONTINUED] oxyCODONE (ROXICODONE) 5 MG immediate release tablet Take 1 tablet (5 mg total) by mouth every 6 (six) hours as needed for Pain. 24 tablet 0    [DISCONTINUED] oxyCODONE (ROXICODONE) 5 mg/5 mL Soln Take 5 mLs (5 mg total) by mouth every 6 (six) hours as needed (pain). 100 mL 0    [] midazolam (PF) (VERSED) 1 mg/mL injection 2 mg       [DISCONTINUED] 0.9%  NaCl infusion       [DISCONTINUED] dexAMETHasone injection       [DISCONTINUED] dexmedeTOMIDine injection       [DISCONTINUED] fentaNYL injection       [DISCONTINUED] ketamine injection       [DISCONTINUED] labetaloL injection       [DISCONTINUED] lactated ringers infusion        [DISCONTINUED] LIDOcaine (cardiac) injection       [DISCONTINUED] mupirocin 2 % ointment       [DISCONTINUED] ondansetron injection       [DISCONTINUED] propofol (DIPRIVAN) 10 mg/mL infusion       [DISCONTINUED] rocuronium injection        No facility-administered encounter medications on file as of 7/25/2024.       PHYSICAL EXAM:  Vitals:    07/25/24 1332   BP: 122/77   Pulse: 88   Resp: 18   Temp: 97.3 °F (36.3 °C)       General Appearance: well nourished, well-developed, alert, oriented, in no acute distress, no dysphonia  Head/Face: Normocephalic, atraumatic  Eyes: EOMI, normal conjunctiva  Ears: Hears well at normal conversation volume  AD: external normal, ear canal normal, TM intact without effusion or retraction  AS: external normal, ear canal normal, TM intact without effusion or retraction  Nose: External nose normal, septum midline, mild inferior turbinate hypertrophy, no epistaxis  Oral Cavity & Oropharynx: Lips normal. Tongue without masses or lesions. Dentition normal.  Postsurgical changes 2 tonsillar fossa.  Right tonsillar fossa mildly edematous and erythematous without clot and some granulation tissue, left tonsillar fossa mildly erythematous and edematous without clot with moderate granulation tissue, of note there is now asymmetry of her palate as a more mucosa was taken on the left than the right during surgery due to extensive toxins tissue, uvula midline and moderately edematous  Neck: Soft, non-tender, no palpable lymph nodes. Thyroid without nodules or goiter.   Neuro: CN II - XII intact  Psychiatric: oriented to time, place and person, no depression, anxiety or agitation    Pathology:  Final Diagnosis      1. Left tonsil, tonsillectomy:  - Reactive lymphoid hyperplasia.     2. Right tonsil, tonsillectomy:  - Reactive lymphoid hyperplasia.        ASSESSMENT:  Marta Markham is a 28 y.o. female with recurrent tonsillitis bilateral asymmetric tonsillar hypertrophy and significant sleep  symptoms s/p tonsillectomy. Patient had a rough postoperative she had initially had significant pain and dysphagia which has gradually been improving. Pathology still benign    PLAN:  -- continue to advance diet as tolerated  -- return to clinic in 1 month for postop wound check    Bobby Olson MD  [facility] Otolaryngology  11:18 AM 07/25/2024

## (undated) DEVICE — SUT VICRYL PLUS 3-0 SH 18IN

## (undated) DEVICE — SUCTION COAGULATOR 10FR 6IN

## (undated) DEVICE — KIT ANTIFOG W/SPONG & FLUID

## (undated) DEVICE — KIT SURGICAL TURNOVER

## (undated) DEVICE — GLOVE SIGNATURE MICRO LTX 7.5

## (undated) DEVICE — Device

## (undated) DEVICE — SOL 9P NACL IRR PIC IL

## (undated) DEVICE — CATH ALL PUR URTHL RR 10FR

## (undated) DEVICE — MANIFOLD 4 PORT